# Patient Record
Sex: MALE | Race: BLACK OR AFRICAN AMERICAN | NOT HISPANIC OR LATINO | Employment: UNEMPLOYED | URBAN - METROPOLITAN AREA
[De-identification: names, ages, dates, MRNs, and addresses within clinical notes are randomized per-mention and may not be internally consistent; named-entity substitution may affect disease eponyms.]

---

## 2021-12-01 ENCOUNTER — OFFICE VISIT (OUTPATIENT)
Dept: ORTHOPEDICS | Facility: CLINIC | Age: 26
End: 2021-12-01
Payer: COMMERCIAL

## 2021-12-01 VITALS
RESPIRATION RATE: 16 BRPM | DIASTOLIC BLOOD PRESSURE: 76 MMHG | SYSTOLIC BLOOD PRESSURE: 122 MMHG | WEIGHT: 210.31 LBS | BODY MASS INDEX: 28.48 KG/M2 | HEART RATE: 95 BPM | HEIGHT: 72 IN

## 2021-12-01 DIAGNOSIS — M25.561 ACUTE PAIN OF RIGHT KNEE: Primary | ICD-10-CM

## 2021-12-01 PROCEDURE — 99999 PR PBB SHADOW E&M-EST. PATIENT-LVL IV: CPT | Mod: PBBFAC,,, | Performed by: ORTHOPAEDIC SURGERY

## 2021-12-01 PROCEDURE — 99203 OFFICE O/P NEW LOW 30 MIN: CPT | Mod: S$GLB,,, | Performed by: ORTHOPAEDIC SURGERY

## 2021-12-01 PROCEDURE — 99203 PR OFFICE/OUTPT VISIT, NEW, LEVL III, 30-44 MIN: ICD-10-PCS | Mod: S$GLB,,, | Performed by: ORTHOPAEDIC SURGERY

## 2021-12-01 PROCEDURE — 99999 PR PBB SHADOW E&M-EST. PATIENT-LVL IV: ICD-10-PCS | Mod: PBBFAC,,, | Performed by: ORTHOPAEDIC SURGERY

## 2024-03-26 ENCOUNTER — TELEPHONE (OUTPATIENT)
Dept: PRIMARY CARE CLINIC | Facility: CLINIC | Age: 29
End: 2024-03-26
Payer: COMMERCIAL

## 2024-03-26 NOTE — TELEPHONE ENCOUNTER
----- Message from Norm Camacho sent at 3/26/2024  8:15 AM CDT -----  Regarding: appt request  PATIENT CALL    Pt called to schedule an appt, endorses chest pain. He'd specifically like an appt for this Thursday morning. I did let him know that [facility] is out of network, and he's ok with being a self pay patient. Please call back at [phone]

## 2024-03-26 NOTE — TELEPHONE ENCOUNTER
Called pt regarding message. Informed pt Adamaris Zepeda NP new office number. Pt verbalized understanding.

## 2024-08-20 ENCOUNTER — TELEPHONE (OUTPATIENT)
Dept: ORTHOPEDICS | Facility: CLINIC | Age: 29
End: 2024-08-20
Payer: COMMERCIAL

## 2024-08-20 NOTE — TELEPHONE ENCOUNTER
----- Message from Pia Garcia sent at 8/19/2024  7:26 PM CDT -----  Type:  Appointment Request     Name of Caller:AYAKA AGUILAR [[id number]]  When is the first available appointment?No access  Symptoms:shoulder  Would the patient rather a call back or a response via [facility]? call  Best Call Back Number:[phone]   Additional Information: The patient is looking to schedule an appt with rere escalona Or poly. Please reach out to the patient to schedule an appt.

## 2024-08-20 NOTE — TELEPHONE ENCOUNTER
----- Message from Delaney Kirby LPN sent at 8/20/2024 11:55 AM CDT -----    ----- Message -----  From: Pia Garcia  Sent: 8/19/2024   7:29 PM CDT  To: [facility] Staff; #    Type:  Appointment Request     Name of Caller:AYAKA AGUILAR [[id number]]  When is the first available appointment?No access  Symptoms:shoulder  Would the patient rather a call back or a response via [facility]? call  Best Call Back Number:[phone]   Additional Information: The patient is looking to schedule an appt with jonathan escalona. Or kenny. Please reach out to the patient to schedule an appt.

## 2024-08-20 NOTE — TELEPHONE ENCOUNTER
Spoke with pt. Pt states he would like to schedule an appointment for shoulder pain. Advised pt that his insurance, [employer], is out of network with [facility]. Advised pt to call his insurance for a list of providers that are in network with his plan. All questions answered. Pt verbalized understanding.

## 2024-08-20 NOTE — TELEPHONE ENCOUNTER
Spoke with patient. Pt advise that unfortunately we dont take [employer] [place]. Pt verbalized understanding.

## 2025-07-02 ENCOUNTER — TELEPHONE (OUTPATIENT)
Dept: OPHTHALMOLOGY | Facility: CLINIC | Age: 30
End: 2025-07-02
Payer: COMMERCIAL
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
